# Patient Record
Sex: FEMALE | Race: WHITE | NOT HISPANIC OR LATINO | Employment: FULL TIME | URBAN - METROPOLITAN AREA
[De-identification: names, ages, dates, MRNs, and addresses within clinical notes are randomized per-mention and may not be internally consistent; named-entity substitution may affect disease eponyms.]

---

## 2020-11-23 PROBLEM — Z34.90 PREGNANCY: Status: ACTIVE | Noted: 2020-11-23

## 2021-03-16 PROBLEM — Z34.90 PREGNANCY: Status: RESOLVED | Noted: 2020-11-23 | Resolved: 2021-03-16

## 2024-01-02 ENCOUNTER — PATIENT MESSAGE (OUTPATIENT)
Dept: ADMINISTRATIVE | Facility: OTHER | Age: 24
End: 2024-01-02

## 2024-01-09 ENCOUNTER — PATIENT MESSAGE (OUTPATIENT)
Dept: ADMINISTRATIVE | Facility: OTHER | Age: 24
End: 2024-01-09

## 2024-02-23 PROBLEM — O46.8X2 SUBCHORIONIC HEMATOMA IN SECOND TRIMESTER: Status: ACTIVE | Noted: 2024-02-23

## 2024-02-23 PROBLEM — O41.8X20 SUBCHORIONIC HEMATOMA IN SECOND TRIMESTER: Status: ACTIVE | Noted: 2024-02-23

## 2024-02-26 PROBLEM — O43.192 MARGINAL INSERTION OF UMBILICAL CORD AFFECTING MANAGEMENT OF MOTHER IN SECOND TRIMESTER: Status: ACTIVE | Noted: 2024-02-26

## 2024-04-02 PROBLEM — O43.192 MARGINAL INSERTION OF UMBILICAL CORD AFFECTING MANAGEMENT OF MOTHER IN SECOND TRIMESTER: Status: RESOLVED | Noted: 2024-02-26 | Resolved: 2024-04-02

## 2024-04-02 PROBLEM — O46.8X2 SUBCHORIONIC HEMATOMA IN SECOND TRIMESTER: Status: RESOLVED | Noted: 2024-02-23 | Resolved: 2024-04-02

## 2024-04-02 PROBLEM — O41.8X20 SUBCHORIONIC HEMATOMA IN SECOND TRIMESTER: Status: RESOLVED | Noted: 2024-02-23 | Resolved: 2024-04-02

## 2024-04-23 ENCOUNTER — PATIENT MESSAGE (OUTPATIENT)
Dept: ADMINISTRATIVE | Facility: OTHER | Age: 24
End: 2024-04-23

## 2024-05-02 PROBLEM — O43.193 MARGINAL INSERTION OF UMBILICAL CORD AFFECTING MANAGEMENT OF MOTHER IN THIRD TRIMESTER: Status: ACTIVE | Noted: 2024-02-26

## 2024-06-06 PROBLEM — O99.013 ANEMIA AFFECTING PREGNANCY IN THIRD TRIMESTER: Status: ACTIVE | Noted: 2024-06-06

## 2024-06-21 PROBLEM — O16.3 ELEVATED BLOOD PRESSURE AFFECTING PREGNANCY IN THIRD TRIMESTER, ANTEPARTUM: Status: ACTIVE | Noted: 2024-06-21

## 2024-07-09 PROBLEM — Z3A.39 39 WEEKS GESTATION OF PREGNANCY: Status: ACTIVE | Noted: 2024-07-09

## 2024-07-10 PROBLEM — O16.3 ELEVATED BLOOD PRESSURE AFFECTING PREGNANCY IN THIRD TRIMESTER, ANTEPARTUM: Status: RESOLVED | Noted: 2024-06-21 | Resolved: 2024-07-10

## 2024-07-10 PROBLEM — O99.013 ANEMIA AFFECTING PREGNANCY IN THIRD TRIMESTER: Status: RESOLVED | Noted: 2024-06-06 | Resolved: 2024-07-10

## 2024-07-10 PROBLEM — Z3A.39 39 WEEKS GESTATION OF PREGNANCY: Status: RESOLVED | Noted: 2024-07-09 | Resolved: 2024-07-10

## 2024-07-10 PROBLEM — O43.193 MARGINAL INSERTION OF UMBILICAL CORD AFFECTING MANAGEMENT OF MOTHER IN THIRD TRIMESTER: Status: RESOLVED | Noted: 2024-02-26 | Resolved: 2024-07-10

## 2024-10-12 ENCOUNTER — NURSE TRIAGE (OUTPATIENT)
Dept: ADMINISTRATIVE | Facility: CLINIC | Age: 24
End: 2024-10-12

## 2024-10-12 NOTE — TELEPHONE ENCOUNTER
Pt reports vaginal spotting since getting her IUD placed. Advised, per protocol and verbalizes understanding. Informed I would route a message to provider for follow up in this regard.     Reason for Disposition   [1] Bleeding or spotting between periods since IUD was placed AND [2] 3 or less months (3 menstrual cycles) since IUD was placed    Additional Information   Negative: Shock suspected (e.g., cold/pale/clammy skin, too weak to stand, low BP, rapid pulse)   Negative: Sounds like a life-threatening emergency to the triager   Negative: SEVERE vaginal bleeding (e.g., soaking 2 pads or tampons per hour and present 2 or more hours; 1 menstrual cup every 2 hours)   Negative: SEVERE dizziness (e.g., unable to stand, requires support to walk, feels like passing out now)   Negative: Patient sounds very sick or weak to the triager   Negative: MODERATE vaginal bleeding (e.g., soaking 1 pad or tampon per hour and present > 6 hours; 1 menstrual cup every 6 hours)   Negative: [1] Vaginal bleeding is WORSE than normal (e.g., heavier) AND [2] dizziness or lightheadedness   Negative: [1] Constant abdominal pain AND [2] present > 2 hours   Negative: [1] Caller has URGENT question AND [2] triager unable to answer question   Negative: [1] MODERATE pain (e.g., interferes with normal activities) AND [2] pain comes and goes (cramps) AND [3] present > 24 hours  (Exception: Pain with Vomiting or Diarrhea - see that Guideline.)   Negative: [1] MILD abdominal pain (e.g., does not interfere with normal activities) AND [2] pain comes and goes (cramps) AND [3] present > 48 hours   Negative: [1] Caller has NON-URGENT question AND [2] triager unable to answer question   Negative: Pregnant   Negative: [1] Vaginal bleeding with > 6 soaked pads or tampons per day AND [2] last > 7 days   Negative: Cannot feel IUD string or worried that IUD is not in right place (e.g., string longer than usual, can feel hard plastic of IUD)   Negative: IUD came  out (e.g., IUD fell out of vagina)   Negative: Bad smelling vaginal discharge   Negative: Abnormal color vaginal discharge (i.e., yellow, green, gray)   Negative: [1] Bleeding or spotting between periods since IUD was placed AND [2] more than 3 months (3 menstrual cycles)   Negative: [1] Periods are WORSE (more bleeding or pain) since IUD was placed AND [2] more than 3 months (3 menstrual cycles)   Negative: [1] Vaginal bleeding with > 6 soaked pads or tampons per day BUT [2] lasts 7 days or less   Negative: Vaginal bleeding lasts > 7 days   Negative: [1] Has a 3-year hormonal IUD (e.g., Jaydess, Sklya) AND [2] more than 3 years since insertion   Negative: [1] Has 5-year hormonal IUD (e.g., Kyleena) AND [2] more than 5 years since insertion   Negative: [1] Has 8-year hormonal IUD (e.g., Liletta, Mirena) AND [2] more than 8 years since insertion   Negative: [1] Has copper IUD (e.g., ParaGard) AND [2] more than 10 years since insertion   Negative: Wants to get IUD removed    Protocols used: Contraception - IUD Symptoms and [id number]